# Patient Record
Sex: FEMALE | Race: WHITE
[De-identification: names, ages, dates, MRNs, and addresses within clinical notes are randomized per-mention and may not be internally consistent; named-entity substitution may affect disease eponyms.]

---

## 2024-03-27 ENCOUNTER — APPOINTMENT (OUTPATIENT)
Dept: PULMONOLOGY | Facility: CLINIC | Age: 74
End: 2024-03-27
Payer: COMMERCIAL

## 2024-03-27 VITALS
TEMPERATURE: 97.7 F | OXYGEN SATURATION: 100 % | SYSTOLIC BLOOD PRESSURE: 116 MMHG | HEART RATE: 77 BPM | DIASTOLIC BLOOD PRESSURE: 76 MMHG

## 2024-03-27 VITALS
SYSTOLIC BLOOD PRESSURE: 133 MMHG | OXYGEN SATURATION: 96 % | TEMPERATURE: 97.7 F | DIASTOLIC BLOOD PRESSURE: 80 MMHG | HEART RATE: 60 BPM

## 2024-03-27 PROCEDURE — 99204 OFFICE O/P NEW MOD 45 MIN: CPT

## 2024-03-27 NOTE — CONSULT LETTER
[Dear  ___] : Dear  [unfilled], [FreeTextEntry1] : I had the pleasure of evaluating your patient, DEMAR SINGER , in the office today.  Please review my consultation and evaluation report that follows below.  Please do not hesitate to call me if further information is necessary or if you wish to discuss ongoing care or diagnostic work-up.    I very much appreciate your referral and it is a privilege to be able to provide care for your patient.  Sincerely,   Jonah Flores MD, MHCM, FACP, KIMBERLY-C Pulmonary Medicine  of Medicine Shivam gracie Mojica [facility] at [facility] [email or web address] in [address] [place]  [address]    Fax   [facility] at [address]  [phone]

## 2024-03-27 NOTE — ASSESSMENT
[FreeTextEntry1] : Very nice 73 yo woman comes in with her daughter, patient of Dr Olson I took care of her  in the past--he has passed away Recent bronchitis with cough in January, no CXR done Persistent coughing--sent for lung cancer screening at [facility] Smoker of 1 ppd for many years and still smoking Only on statin for HLD Has had ++Calcium in heart , previous cardiac w/u neg in 2017 as were her lung fields Has not been back to cardiology since then Three children and eight grandchildren, [id number]  CT reviewed with patient: 1.6 lesion in RLL with irregular borders and spiculation, no other abnormalities Adrenal nodules noted---she notes that thes had been followed in the past and were found benign MRIS  No history of pneumonia, asthma Caring for her ill mother who turned 100 recently  Imp 73 yo woman, smoker for many years with recent bronchitis and found to have dense LLL lesion 1.6 This looks less like residua of pneumonia than a pulmonary neoplasm unfortunately Recommend PET scan now, PFTs and surgical evaluation Also, recommend cardiac reevaluation as surgery may be necessary for this patient Case discussed extensively with patient and her daughter

## 2024-03-27 NOTE — PHYSICAL EXAM
[No Acute Distress] : no acute distress [Normal Oropharynx] : normal oropharynx [No Neck Mass] : no neck mass [Normal Appearance] : normal appearance [Normal S1, S2] : normal s1, s2 [Normal Rate/Rhythm] : normal rate/rhythm [No Murmurs] : no murmurs [No Resp Distress] : no resp distress [Clear to Auscultation Bilaterally] : clear to auscultation bilaterally [Benign] : benign [No Abnormalities] : no abnormalities [Normal Gait] : normal gait [No Clubbing] : no clubbing [No Cyanosis] : no cyanosis [FROM] : FROM [No Edema] : no edema [Normal Color/ Pigmentation] : normal color/ pigmentation [No Focal Deficits] : no focal deficits [Oriented x3] : oriented x3 [Normal Affect] : normal affect

## 2024-03-27 NOTE — HISTORY OF PRESENT ILLNESS
[TextBox_4] : Very nice 75 yo woman comes in with her daughter, patient of Dr Olson I took care of her  in the past--he has passed away Recent bronchitis with cough in January, no CXR done Persistent coughing--sent for lung cancer screening at [facility] Smoker of 1 ppd for many years and still smoking Only on statin for HLD Has had ++Calcium in heart , previous cardiac w/u neg in 2017 as were her lung fields Has not been back to cardiology since then Three children and eight grandchildren, [id number]  CT reviewed with patient: 1.6 lesion in RLL with irregular borders and spiculation, no other abnormalities Adrenal nodules noted---she notes that thes had been followed in the past and were found benign MRIS  No history of pneumonia, asthma Caring for her ill mother who turned 100 recently

## 2024-03-29 ENCOUNTER — APPOINTMENT (OUTPATIENT)
Dept: PULMONOLOGY | Facility: CLINIC | Age: 74
End: 2024-03-29
Payer: COMMERCIAL

## 2024-03-29 VITALS
HEIGHT: 62.25 IN | DIASTOLIC BLOOD PRESSURE: 71 MMHG | SYSTOLIC BLOOD PRESSURE: 120 MMHG | BODY MASS INDEX: 23.62 KG/M2 | WEIGHT: 130 LBS | OXYGEN SATURATION: 98 % | HEART RATE: 55 BPM | RESPIRATION RATE: 16 BRPM

## 2024-03-29 VITALS — BODY MASS INDEX: 23.62 KG/M2 | HEIGHT: 62.25 IN | WEIGHT: 130 LBS

## 2024-03-29 PROCEDURE — 99213 OFFICE O/P EST LOW 20 MIN: CPT | Mod: 25

## 2024-03-29 PROCEDURE — 94729 DIFFUSING CAPACITY: CPT

## 2024-03-29 PROCEDURE — 94727 GAS DIL/WSHOT DETER LNG VOL: CPT

## 2024-03-29 PROCEDURE — 85018 HEMOGLOBIN: CPT | Mod: QW

## 2024-03-29 PROCEDURE — 94010 BREATHING CAPACITY TEST: CPT

## 2024-03-29 NOTE — ASSESSMENT
[FreeTextEntry1] : Very nice 73 yo woman comes in with her daughter, patient of Dr Olson I took care of her  in the past--he has passed away Recent bronchitis with cough in January, no CXR done Persistent coughing--sent for lung cancer screening at [facility] Smoker of 1 ppd for many years and still smoking Only on statin for HLD Has had ++Calcium in heart , previous cardiac w/u neg in 2017 as were her lung fields Has not been back to cardiology since then Three children and eight grandchildren, [id number]  CT reviewed with patient: 1.6 lesion in RLL with irregular borders and spiculation, no other abnormalities Adrenal nodules noted---she notes that thes had been followed in the past and were found benign MRIS  No history of pneumonia, asthma Caring for her ill mother who turned 100 recently  Imp 73 yo woman, smoker for many years with recent bronchitis and found to have dense LLL lesion 1.6 This looks less like residua of pneumonia than a pulmonary neoplasm unfortunately Recommend PET scan now, PFTs and surgical evaluation Also, recommend cardiac reevaluation as surgery may be necessary for this patient Case discussed extensively with patient and her daughter.  Returns today for PFTs: No evidence of obstructive or restrictive disease  Imp 73 yo woman with LLL lesion suggestive of neoplasm Further discussion re: planned interventions Needs cardiac eval and thoracic surgery eval

## 2024-03-29 NOTE — HISTORY OF PRESENT ILLNESS
[TextBox_4] : Very nice 75 yo woman comes in with her daughter, patient of Dr Olson I took care of her  in the past--he has passed away Recent bronchitis with cough in January, no CXR done Persistent coughing--sent for lung cancer screening at [facility] Smoker of 1 ppd for many years and still smoking Only on statin for HLD Has had ++Calcium in heart , previous cardiac w/u neg in 2017 as were her lung fields Has not been back to cardiology since then Three children and eight grandchildren, [id number]  CT reviewed with patient: 1.6 lesion in RLL with irregular borders and spiculation, no other abnormalities Adrenal nodules noted---she notes that thes had been followed in the past and were found benign MRIS  No history of pneumonia, asthma Caring for her ill mother who turned 100 recently  Imp 75 yo woman, smoker for many years with recent bronchitis and found to have dense LLL lesion 1.6 This looks less like residua of pneumonia than a pulmonary neoplasm unfortunately Recommend PET scan now, PFTs and surgical evaluation Also, recommend cardiac reevaluation as surgery may be necessary for this patient Case discussed extensively with patient and her daughter.  Returns today for PFTs: No evidence of obstructive or restrictive disease

## 2024-04-05 ENCOUNTER — OUTPATIENT (OUTPATIENT)
Dept: OUTPATIENT SERVICES | Facility: HOSPITAL | Age: 74
LOS: 1 days | End: 2024-04-05

## 2024-04-05 ENCOUNTER — APPOINTMENT (OUTPATIENT)
Dept: NUCLEAR MEDICINE | Facility: CLINIC | Age: 74
End: 2024-04-05
Payer: COMMERCIAL

## 2024-04-05 DIAGNOSIS — R91.1 SOLITARY PULMONARY NODULE: ICD-10-CM

## 2024-04-05 PROCEDURE — 78815 PET IMAGE W/CT SKULL-THIGH: CPT | Mod: 26,PI

## 2024-04-08 ENCOUNTER — APPOINTMENT (OUTPATIENT)
Dept: CARDIOLOGY | Facility: CLINIC | Age: 74
End: 2024-04-08
Payer: COMMERCIAL

## 2024-04-08 VITALS — HEART RATE: 70 BPM | SYSTOLIC BLOOD PRESSURE: 131 MMHG | OXYGEN SATURATION: 97 % | DIASTOLIC BLOOD PRESSURE: 74 MMHG

## 2024-04-08 VITALS — BODY MASS INDEX: 24.17 KG/M2 | HEIGHT: 62.25 IN | WEIGHT: 133 LBS

## 2024-04-08 DIAGNOSIS — Z83.438 FAMILY HISTORY OF OTHER DISORDER OF LIPOPROTEIN METABOLISM AND OTHER LIPIDEMIA: ICD-10-CM

## 2024-04-08 DIAGNOSIS — I25.84 ATHEROSCLEROTIC HEART DISEASE OF NATIVE CORONARY ARTERY W/OUT ANGINA PECTORIS: ICD-10-CM

## 2024-04-08 DIAGNOSIS — Z82.3 FAMILY HISTORY OF STROKE: ICD-10-CM

## 2024-04-08 DIAGNOSIS — Z01.818 ENCOUNTER FOR OTHER PREPROCEDURAL EXAMINATION: ICD-10-CM

## 2024-04-08 DIAGNOSIS — Z72.3 LACK OF PHYSICAL EXERCISE: ICD-10-CM

## 2024-04-08 DIAGNOSIS — Z78.9 OTHER SPECIFIED HEALTH STATUS: ICD-10-CM

## 2024-04-08 DIAGNOSIS — R06.02 SHORTNESS OF BREATH: ICD-10-CM

## 2024-04-08 DIAGNOSIS — I25.10 ATHEROSCLEROTIC HEART DISEASE OF NATIVE CORONARY ARTERY W/OUT ANGINA PECTORIS: ICD-10-CM

## 2024-04-08 PROCEDURE — G2211 COMPLEX E/M VISIT ADD ON: CPT

## 2024-04-08 PROCEDURE — 93000 ELECTROCARDIOGRAM COMPLETE: CPT

## 2024-04-08 PROCEDURE — 99205 OFFICE O/P NEW HI 60 MIN: CPT

## 2024-04-08 RX ORDER — ASPIRIN 81 MG
81 TABLET, DELAYED RELEASE (ENTERIC COATED) ORAL
Refills: 0 | Status: ACTIVE | COMMUNITY

## 2024-04-08 RX ORDER — UBIDECARENONE/VIT E ACET 100MG-5
CAPSULE ORAL
Refills: 0 | Status: ACTIVE | COMMUNITY

## 2024-04-08 RX ORDER — BIOTIN 10 MG
TABLET ORAL
Refills: 0 | Status: ACTIVE | COMMUNITY

## 2024-04-08 RX ORDER — ROSUVASTATIN CALCIUM 5 MG/1
5 TABLET, FILM COATED ORAL DAILY
Refills: 0 | Status: ACTIVE | COMMUNITY

## 2024-04-08 RX ORDER — MULTIVIT-MIN/FOLIC/VIT K/LYCOP 400-300MCG
1000 TABLET ORAL DAILY
Refills: 0 | Status: ACTIVE | COMMUNITY

## 2024-04-10 PROBLEM — R91.1 PULMONARY NODULE, RIGHT: Status: ACTIVE | Noted: 2024-03-27

## 2024-04-11 PROBLEM — Z01.818 PREOPERATIVE CLEARANCE: Status: ACTIVE | Noted: 2024-04-11

## 2024-04-15 ENCOUNTER — NON-APPOINTMENT (OUTPATIENT)
Age: 74
End: 2024-04-15

## 2024-04-15 ENCOUNTER — APPOINTMENT (OUTPATIENT)
Dept: THORACIC SURGERY | Facility: CLINIC | Age: 74
End: 2024-04-15
Payer: COMMERCIAL

## 2024-04-15 VITALS
OXYGEN SATURATION: 96 % | DIASTOLIC BLOOD PRESSURE: 83 MMHG | WEIGHT: 136 LBS | HEIGHT: 62.25 IN | BODY MASS INDEX: 24.71 KG/M2 | HEART RATE: 60 BPM | SYSTOLIC BLOOD PRESSURE: 146 MMHG | TEMPERATURE: 98.3 F | RESPIRATION RATE: 16 BRPM

## 2024-04-15 DIAGNOSIS — F17.210 NICOTINE DEPENDENCE, CIGARETTES, UNCOMPLICATED: ICD-10-CM

## 2024-04-15 DIAGNOSIS — R91.1 SOLITARY PULMONARY NODULE: ICD-10-CM

## 2024-04-15 PROCEDURE — 99204 OFFICE O/P NEW MOD 45 MIN: CPT

## 2024-04-15 NOTE — ASSESSMENT
[FreeTextEntry1] : Drea is a 74-year-old female current smoker who presents with a right lower lobe nodule that has seen on surveillance imaging.  A PET scan was performed which demonstrated activity and no evidence of additional disease.  This is highly suspicious for a primary lung carcinoma and I have recommended thoracoscopy and removal.  Thank you for allowing me to participate in the care of your patient.  45 minutes was spent during this encounter.  Daryn Sanders MD Department of Cardiovascular and Thoracic Surgery  [employer] [facility]

## 2024-04-15 NOTE — REVIEW OF SYSTEMS
[Negative] : Heme/Lymph [Fever] : no fever [Chills] : no chills [Feeling Poorly] : not feeling poorly [Feeling Tired] : not feeling tired [Shortness Of Breath] : no shortness of breath [Wheezing] : no wheezing [Cough] : no cough [SOB on Exertion] : no shortness of breath during exertion

## 2024-04-15 NOTE — HISTORY OF PRESENT ILLNESS
[FreeTextEntry1] : Ms. DEMAR SINGER is a 74-year-old female, referred by Dr. Flores, who presents for consultation regarding a right lower lobe nodule that is intensely hypermetabolic on PET imaging.  Past medical history includes current smoker.  Today the patient reports that she is feeling well; she denies chest pain, palpitations, shortness of breath, cough, dysphagia, nausea/vomiting, fevers, chills, fatigue, unintentional weight loss or gain, and night sweats.

## 2024-04-15 NOTE — DATA REVIEWED
[FreeTextEntry1] : PETCT [facility] ONC FDG INIT  performed through [facility] PROCEDURE DATE:  04/05/2024 INTERPRETATION:  CLINICAL INFORMATION: 74 year-old female with right lower lobe mass.  TREATMENT STRATEGY EVALUATION: Initial FASTING BLOOD SUGAR: 100 mg/dl RADIOPHARMACEUTICAL: 10.02 mCi F-18 FDG, I.V. I.V. SITE: antecubital right UPTAKE PERIOD: 50 min SCANNER: [employer] 710 ORAL CONTRAST: Omnipaque 300  TECHNIQUE: Following intravenous injection of radiopharmaceutical and above uptake period, PET/CT was obtained from base of skull  to mid-thigh. CT protocol was optimized for PET attenuation correction and anatomic localization and was not designed to produce and cannot replace state-of-the-art diagnostic CT images with specific imaging protocols for different body parts and indications. Images were reconstructed and reviewed in axial, coronal and sagittal views and three-dimensional MIP.  Standardized uptake values ("SUV") are normalized to patient body weight and indicate the highest activity concentration (SUVmax) in a given disease site. All image numbers refer to axial image numbers.  COMPARISON: No prior FDG PET/CT available for review.  OTHER STUDIES USED FOR CORRELATION: Low-dose chest CT 3/21/2024 performed at outside facility.  FINDINGS:  HEAD/NECK: Physiologic FDG activity in visualized brain, head, and neck.  CHEST: No abnormal FDG activity. No lymphadenopathy. Atherosclerosis of coronary arteries and nonaneurysmal thoracic aorta.  LUNGS: Intensely hypermetabolic revisualized superior RIGHT LOWER lobe pulmonary nodule (1.4 x 1.3 cm, SUV 9.3, image 75). Small additional nodule versus area of mild bronchiectasis in the superior RIGHT LOWER lobe (0.5 x 0.5 cm, with no discernible uptake (image 82). Bibasilar atelectasis and scarring are seen without abnormal uptake..  PLEURA/PERICARDIUM: No abnormal FDG activity. No effusions.  ESOPHAGUS/STOMACH: Diffuse gastric uptake is seen, likely physiologic or inflammatory. Esophagus is unremarkable.  HEPATOBILIARY/PANCREAS: Physiologic hepatobiliary FDG activity. For reference, liver SUV mean is 2.3.  SPLEEN: Physiologic FDG activity. Normal size.  ADRENAL GLANDS: No abnormal FDG activity. No nodule(s).  KIDNEYS/URINARY BLADDER: Physiologic excreted FDG activity.  REPRODUCTIVE ORGANS: No abnormal FDG activity. Calcified uterine fibroids.  ABDOMINOPELVIC NODES: No enlarged or FDG-avid lymph node.  BOWEL/PERITONEUM/MESENTERY: Two foci of increased uptake in the sigmoid without clear low-dose CT correlate (SUV 6.8, image 187, and SUV 6.3, image 191), which appear to be intraluminal. Remaining alimentary tract without abnormal uptake.  ABDOMINAL WALL: No abnormal FDG activity.  BONES/SOFT TISSUES: No abnormal FDG activity.  VESSELS: Atherosclerotic calcification of nonaneurysmal abdominal aorta including visceral and/or runoff branches.  IMPRESSION: 1.  Intensely hypermetabolic RIGHT LOWER lobe nodule, highly suspicious for malignant involvement. Additional small RIGHT LOWER lobe nodule is an area of bronchiectasis is below resolution of PET without abnormal uptake. No additional suspicious findings in the thorax. 2.  2 distinct foci of focally increased uptake in the sigmoid colon which appear intraluminal. This may be due to diverticular disease, although hyperplastic or neoplastic polyps are not excluded. Suggest further evaluation with CT or MR enterography; consider colonoscopy. 3.  Otherwise, no evidence of FDG-avid malignancy.  --- End of Report ---  BLANCA WILSON MD; Attending Radiologist       CT Lung Cancer Screening performed at [facility] on 3/21/24: IMPRESSION: - Suspicious visualization of a 16.3mm rounded spiculated masslike opacity in the posterior right lower lobe.  Lung-RADS Category 4B: very suspicious

## 2024-04-25 ENCOUNTER — APPOINTMENT (OUTPATIENT)
Dept: CARDIOLOGY | Facility: CLINIC | Age: 74
End: 2024-04-25
Payer: COMMERCIAL

## 2024-04-25 PROCEDURE — 93015 CV STRESS TEST SUPVJ I&R: CPT

## 2024-04-25 PROCEDURE — A9502: CPT

## 2024-04-25 PROCEDURE — 78452 HT MUSCLE IMAGE SPECT MULT: CPT

## 2024-04-25 PROCEDURE — 93306 TTE W/DOPPLER COMPLETE: CPT

## 2024-05-01 ENCOUNTER — APPOINTMENT (OUTPATIENT)
Dept: THORACIC SURGERY | Facility: HOSPITAL | Age: 74
End: 2024-05-01

## 2024-05-07 ENCOUNTER — APPOINTMENT (OUTPATIENT)
Dept: PULMONOLOGY | Facility: CLINIC | Age: 74
End: 2024-05-07

## 2024-06-17 NOTE — ADDENDUM
[FreeTextEntry1] : Nuclear stress test reviewed and found to be normal.  No further cardiac testing required. Patient to proceed with operation as planned.

## 2024-06-17 NOTE — HISTORY OF PRESENT ILLNESS
[FreeTextEntry1] : Ms. DEMAR SINGER is a very pleasant 74 year woman with a past medical history of CVA, HLD, tobacco abuse, found to have a pulmonary nodule. PET scan demonstrates significant hypermetabolic activity of the nodule. Shew as seen by Dr. Sanders for possible resection and presents for preoperative evaluation.   She has no chest pain but does complain of shortness of breath. No dizziness or lightheadedness. No syncope or near syncope.  ECG today shows normal sinus rhythm.   She can walk up and down the stairs with a load of laundry in her hands.   Otherwise, denies orthopnea, paroxysmal nocturnal dyspnea, lower extremity edema, unexplained weight gain or dyspnea on exertion.

## 2024-06-17 NOTE — DISCUSSION/SUMMARY
[EKG obtained to assist in diagnosis and management of assessed problem(s)] : EKG obtained to assist in diagnosis and management of assessed problem(s) [FreeTextEntry1] : Ms. DEMAR SINGER is a very pleasant 74 year woman with a past medical history of HLD, tobacco use, presents for preoperative evaluation.   #preoperative evaluation: possible wedge resection. Medium risk patient undergoing a moderate risk surgery. She will need further risk stratification with a nuclear stress test and echocardiogram. Asked that these be expedited for her. Planned for next week - depending on above results will determine her overall risk for surgery  #dyspnea on exertion: unclear if cardiac or 2/2 tumor - Echo and stress as above  #HLD: continue statin

## 2024-07-11 ENCOUNTER — APPOINTMENT (OUTPATIENT)
Dept: CARDIOLOGY | Facility: CLINIC | Age: 74
End: 2024-07-11

## 2025-09-03 ENCOUNTER — APPOINTMENT (OUTPATIENT)
Dept: ORTHOPEDIC SURGERY | Facility: CLINIC | Age: 75
End: 2025-09-03
Payer: COMMERCIAL

## 2025-09-03 DIAGNOSIS — M43.17 SPONDYLOLISTHESIS, LUMBOSACRAL REGION: ICD-10-CM

## 2025-09-03 DIAGNOSIS — M48.062 SPINAL STENOSIS, LUMBAR REGION WITH NEUROGENIC CLAUDICATION: ICD-10-CM

## 2025-09-03 PROCEDURE — 99203 OFFICE O/P NEW LOW 30 MIN: CPT

## 2025-09-03 PROCEDURE — 72100 X-RAY EXAM L-S SPINE 2/3 VWS: CPT

## 2025-09-04 ENCOUNTER — APPOINTMENT (OUTPATIENT)
Dept: MRI IMAGING | Facility: CLINIC | Age: 75
End: 2025-09-04
Payer: COMMERCIAL

## 2025-09-04 PROCEDURE — 72148 MRI LUMBAR SPINE W/O DYE: CPT

## 2025-09-19 ENCOUNTER — APPOINTMENT (OUTPATIENT)
Dept: ORTHOPEDIC SURGERY | Facility: CLINIC | Age: 75
End: 2025-09-19
Payer: COMMERCIAL

## 2025-09-19 DIAGNOSIS — M48.062 SPINAL STENOSIS, LUMBAR REGION WITH NEUROGENIC CLAUDICATION: ICD-10-CM

## 2025-09-19 DIAGNOSIS — M43.17 SPONDYLOLISTHESIS, LUMBOSACRAL REGION: ICD-10-CM

## 2025-09-19 PROCEDURE — 99213 OFFICE O/P EST LOW 20 MIN: CPT
